# Patient Record
Sex: FEMALE | Race: WHITE | NOT HISPANIC OR LATINO | Employment: FULL TIME | URBAN - METROPOLITAN AREA
[De-identification: names, ages, dates, MRNs, and addresses within clinical notes are randomized per-mention and may not be internally consistent; named-entity substitution may affect disease eponyms.]

---

## 2017-12-18 ENCOUNTER — HOSPITAL ENCOUNTER (EMERGENCY)
Facility: HOSPITAL | Age: 32
Discharge: HOME | End: 2017-12-18
Attending: EMERGENCY MEDICINE
Payer: MEDICAID

## 2017-12-18 VITALS
OXYGEN SATURATION: 99 % | DIASTOLIC BLOOD PRESSURE: 76 MMHG | SYSTOLIC BLOOD PRESSURE: 127 MMHG | HEIGHT: 69 IN | TEMPERATURE: 98 F | BODY MASS INDEX: 25.18 KG/M2 | HEART RATE: 62 BPM | RESPIRATION RATE: 17 BRPM | WEIGHT: 170 LBS

## 2017-12-18 DIAGNOSIS — R07.89 CHEST WALL PAIN: Primary | ICD-10-CM

## 2017-12-18 DIAGNOSIS — R07.9 CHEST PAIN: ICD-10-CM

## 2017-12-18 LAB
B-HCG UR QL: NEGATIVE
CTP QC/QA: YES

## 2017-12-18 PROCEDURE — 81025 URINE PREGNANCY TEST: CPT | Performed by: EMERGENCY MEDICINE

## 2017-12-18 PROCEDURE — 99284 EMERGENCY DEPT VISIT MOD MDM: CPT | Mod: 25

## 2017-12-18 RX ORDER — HYDROCODONE BITARTRATE AND ACETAMINOPHEN 7.5; 325 MG/1; MG/1
1 TABLET ORAL EVERY 6 HOURS PRN
Qty: 12 TABLET | Refills: 0 | Status: SHIPPED | OUTPATIENT
Start: 2017-12-18 | End: 2017-12-28

## 2017-12-18 RX ORDER — IBUPROFEN 600 MG/1
600 TABLET ORAL EVERY 6 HOURS PRN
Qty: 20 TABLET | Refills: 0 | Status: SHIPPED | OUTPATIENT
Start: 2017-12-18

## 2017-12-19 NOTE — ED NOTES
Pt awaiting MD exam.  VSS.  Apologized for excessive wait.  Skin pink/warm/dry.  Resp =/unlabored.  Speech clear, denies SOB

## 2017-12-19 NOTE — ED NOTES
Left lateral rib pain that began on 11/27 and has continued.  Pt was seen by PCP and had an xray.  Was prescribed abx and completed augmentin.  Pain increases with coughing and movement.  Denies fever.  Denies SOB

## 2017-12-19 NOTE — ED PROVIDER NOTES
Encounter Date: 12/18/2017       History     Chief Complaint   Patient presents with    Chest Pain     left rib pain that started on the 28th given augmentin completed course, then pain returned 1-2 days ago      Patient is a 32-year-old female who complains of pain to her left lateral upper rib cage.  She denies trauma.  Patient says this pain started about 3 weeks ago at which time she saw her primary physician and was placed on antibiotic.  She says the pain started to improve but became worse 2 days ago.  She has increased pain with deep inspiration, certain movements and with palpation.  She denies shortness of breath.  No fever or chills.  She has an occasional cough productive for clear sputum.        The history is provided by the patient.     Review of patient's allergies indicates:  No Known Allergies  History reviewed. No pertinent past medical history.  History reviewed. No pertinent surgical history.  History reviewed. No pertinent family history.  Social History   Substance Use Topics    Smoking status: Current Every Day Smoker    Smokeless tobacco: Not on file    Alcohol use No     Review of Systems   Constitutional: Negative for chills and fever.   Respiratory: Positive for cough. Negative for shortness of breath.    Cardiovascular: Positive for chest pain.   Gastrointestinal: Negative for nausea and vomiting.   Musculoskeletal: Negative for back pain.   Skin: Negative for color change and rash.   Neurological: Negative for dizziness and syncope.   All other systems reviewed and are negative.      Physical Exam     Initial Vitals [12/18/17 1816]   BP Pulse Resp Temp SpO2   (!) 145/77 68 20 97.5 °F (36.4 °C) 100 %      MAP       99.67         Physical Exam    Nursing note and vitals reviewed.  Constitutional: No distress.   HENT:   Head: Normocephalic and atraumatic.   Eyes: EOM are normal.   Neck: Neck supple.   Cardiovascular: Normal rate, regular rhythm and normal heart sounds.    Pulmonary/Chest: Breath sounds normal.   There is tenderness to palpation of the left lateral upper rib cage without bruising, swelling or crepitance.   Musculoskeletal: Normal range of motion.   Neurological: She is alert and oriented to person, place, and time.   Skin: Skin is warm and dry. No rash noted. No erythema.   Psychiatric: Her behavior is normal. Thought content normal.         ED Course   Procedures  Labs Reviewed   POCT URINE PREGNANCY               Medical Decision Making:   Clinical Tests:   Radiological Study: Ordered and Reviewed  ED Management:  32-year-old female with left upper lateral chest wall pain.  Chest x-ray and rib views show no acute abnormalities.  She has no shortness of breath and I do not suspect a pulmonary embolism.  She has a Well's Criteria Score of 0.  I will place her on ibuprofen to take during the day and a short course of Norco if needed for severe pain at night or difficulty sleeping.  I have suggested she follow up again with her primary physician for recheck and further treatment as warranted.    Additional MDM:     Well's Criteria Score:  -Clinical symptoms of DVT (leg swelling, pain with palpation) = 0.0  -Other diagnosis less likely than pulmonary embolism =            0.0  -Heart Rate >100 =   0.0  -Immobilization (= or > than 3 days) or surgery in the previous 4 weeks = 0.0  -Previous DVT/PE = 0.0  -Hemoptysis =          0.0  -Malignancy =           0.0  Well's Probability Score =    0                      ED Course      Clinical Impression:   The primary encounter diagnosis was Chest wall pain. A diagnosis of Chest pain was also pertinent to this visit.                           Narendra Tolentino MD  12/19/17 [id number]